# Patient Record
Sex: FEMALE | Race: WHITE
[De-identification: names, ages, dates, MRNs, and addresses within clinical notes are randomized per-mention and may not be internally consistent; named-entity substitution may affect disease eponyms.]

---

## 2017-12-29 NOTE — PT INITIAL EVALUATION
MEDICAL DIAGNOSIS: neck pain and headaches in pregnancy

TREATMENT DIAGNOSIS: same

DATE OF ONSET: 10/28/17



SUBJECTIVE: Rebekah Martinez presents to physical therapy with neck pain and 
headaches  that started approximately 2 months ago. She reports that she feels 
like  the headaches started around the same time that she found out that she 
was  pregnant. She reports that she is approximately 11 weeks along. She  
reports that it feels like the headaches starts behind her R ear and then  the 
neck pain develops. She states that the neck pain and headaches are  related. 
She states that she cannot find a position or a movement that  makes the 
headaches or neck pain feel better once they have commenced. She  states that 
sitting makes her headaches and neck pain worse. She states  that she is better 
in the am and gets worse as the day progresses.  Furthermore, she reports that 
the pain feels better if she is lying down.  She denies any of the following 
symptoms: dizziness, tinnitus, night pain,  unexplained weight loss, and 
difficulty swallowing. She reports that  posture does not seem to affect her 
pain or headaches. She states that she  typically has around 1-2 headaches a 
week, but the headaches and neck pain  have lasted as long as 3 days and as 
short as one day once it commenced.  She reports that she is having a great day 
today and is not currently  feeling any headache or neck pain symptoms.  Pain 
location is R sided: C0-C7 



REHAB PROBLEM LIST: Increased Pain

Decreased ROM

Decreased Endurance

Decreased Function

Decreased ADL's



PREVIOUS MEDICAL HISTORY: See EMR

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



OBJECTIVE: 



Posture: She demonstrated fair posture, however, the pain did not increase or  
decreased with posture correction. 



ROM: cervical: protrusion, retraction, flexion, extension, lateral flexion R/L,
  and rotation R/L. NIL with normal end feels.



Palpation: Today, she was not tender to palpation as she was having a great 
day. 



Sensation: Intact in B UEs and does not differ from side to side



Special Tests: Since, she did not have pain, we created her symptoms with 
repeated  cervical retraction and cervical retraction with extension and 
abolished  her symptoms with repeated cervical flexion. 



Mobility: Independent



ASSESSMENT: Kati will benefit from skilled physical therapy addressing the 
listed  impairments to abolish symptoms and return to prior level of function. 
She  demonstrated an anterior derangement that responded well or abolished  
symptoms to flexion based exercises. Based on today's examination, she has  an 
85-90 percent chance of making a full recovery in 2-4 weeks. 



Short Term Goals

2 weeks: Pt will demonstrate centralized neck pain to improve function and  
QOL. 

4 weeks: Pt will demonstrate abolish neck pain and headaches to improve  
function and QOL. 



Patient's Goals

decrease headaches and neck pain



PLAN:  Patient to be seen for Manual Therapy/STM/MET

Strengthening/condition

Range of Motion

Spinal Stabilization

Work Hardening/Cond

Stretching

Neuromuscular Re-ed

Closed Chain Program

Posture/Body mechanics

Home Exercise Program

Therapeutic Activities



2x/Week for 4 Weeks



If you have any questions, comments, or concerns about this report or plan, 
please contact me at [phone].



Thank you, 



Lawrence Tran, PT, DPT 
[facility]

## 2018-01-16 ENCOUNTER — HOSPITAL ENCOUNTER (OUTPATIENT)
Dept: HOSPITAL 89 - PT | Age: 33
Discharge: HOME | End: 2018-01-16
Attending: OBSTETRICS & GYNECOLOGY
Payer: COMMERCIAL

## 2018-01-16 DIAGNOSIS — Z33.1: ICD-10-CM

## 2018-01-16 DIAGNOSIS — M54.2: Primary | ICD-10-CM

## 2018-01-16 DIAGNOSIS — R51: ICD-10-CM

## 2018-01-16 PROCEDURE — 97161 PT EVAL LOW COMPLEX 20 MIN: CPT

## 2018-01-17 NOTE — PT PLAN OF CARE
Physician: Anthony Klein MD

Patient is being seen: 1x/week         Therapist: Lawrence Tran, PT, DPT 

Medical Diagnosis: neck pain and headaches in pregnancy

Treatment Diagnosis: same

Date of Onset: 10/28/17         Date of Initial Evaluation: 12/28/17

Date patient was last seen: 01/16/18

Number of treatments: 2         Number of cancellations/No shows: 0



INTERVENTIONS:

Manual Therapy/STM/MET

Strengthening/condition

Range of Motion

Spinal Stabilization

Work Hardening/Cond

Stretching

Neuromuscular Re-ed

Closed Chain Program

Posture/Body mechanics

Home Exercise Program

Therapeutic Activities





GOALS:

2 weeks: Pt will demonstrate centralized neck pain to improve function and  
QOL. MET 

4 weeks: Pt will demonstrate abolish neck pain and headaches to improve  
function and QOL. MET 



PATIENT'S GOAL:

decrease headaches and neck pain



Status of Patient's Goals: MET 



Patient Compliance:  MET          Prognosis: Excellent



Reasons for discontinuing therapy: This is a discharge note for Kati Roberto. 
Kati reports that she has been doing her exercise and she has modified her work 
station. She reports that she has no headache or neck pain. She demonstrated 
full cervical AROM with normal end feels. She improved her neck pain and 
disability index from 18% to 2%. At her initial examination, she reported neck 
pain and headaches to be 4/10 and today she reported her neck pain and 
headaches to be 0/10. She reports that she felt a headache coming up this week 
and was able to quickly eliminate her headache with her specific exercise. She 
has met all of her goals. She is independent with her specific exercise. As a 
result, she will be discharged from formal PT. 





Posture: She demonstrated fair posture, however, the pain did not increase or  
decrease with posture correction. 

ROM: cervical: protrusion, retraction, flexion, extension, lateral flexion R/L,
  and rotation R/L. NIL with normal end feels.

Palpation: Today, she was not tender to palpation as she was having a great 
day. 

Special Tests: Since, she did not have pain, we created her symptoms with 
repeated  cervical retraction and cervical retraction with extension and 
abolished  her symptoms with repeated cervical flexion. 

Mobility: Independent



If you have any questions, please contact me at .



Thank you,



Lawrence Tran, PT, DPT 
[facility]

## 2018-07-19 ENCOUNTER — HOSPITAL ENCOUNTER (INPATIENT)
Dept: HOSPITAL 89 - OB | Age: 33
LOS: 2 days | Discharge: HOME | End: 2018-07-21
Attending: OBSTETRICS & GYNECOLOGY | Admitting: OBSTETRICS & GYNECOLOGY
Payer: COMMERCIAL

## 2018-07-19 VITALS — SYSTOLIC BLOOD PRESSURE: 125 MMHG | DIASTOLIC BLOOD PRESSURE: 85 MMHG

## 2018-07-19 VITALS
WEIGHT: 210 LBS | WEIGHT: 210 LBS | BODY MASS INDEX: 34.99 KG/M2 | HEIGHT: 65 IN | BODY MASS INDEX: 34.99 KG/M2 | HEIGHT: 65 IN

## 2018-07-19 DIAGNOSIS — Z88.0: ICD-10-CM

## 2018-07-19 DIAGNOSIS — E03.9: ICD-10-CM

## 2018-07-19 DIAGNOSIS — Z3A.40: ICD-10-CM

## 2018-07-19 LAB — PLATELET COUNT, AUTOMATED: 254 K/UL (ref 150–450)

## 2018-07-19 PROCEDURE — 86900 BLOOD TYPING SEROLOGIC ABO: CPT

## 2018-07-19 PROCEDURE — 36415 COLL VENOUS BLD VENIPUNCTURE: CPT

## 2018-07-19 PROCEDURE — 86850 RBC ANTIBODY SCREEN: CPT

## 2018-07-19 PROCEDURE — 86901 BLOOD TYPING SEROLOGIC RH(D): CPT

## 2018-07-19 PROCEDURE — 85027 COMPLETE CBC AUTOMATED: CPT

## 2018-07-19 PROCEDURE — 85025 COMPLETE CBC W/AUTO DIFF WBC: CPT

## 2018-07-19 RX ADMIN — SODIUM CHLORIDE, SODIUM LACTATE, POTASSIUM CHLORIDE, AND CALCIUM CHLORIDE PRN MLS/HR: 600; 310; 30; 20 INJECTION, SOLUTION INTRAVENOUS at 21:46

## 2018-07-19 RX ADMIN — SODIUM CHLORIDE SCH MLS/HR: 900 IRRIGANT IRRIGATION at 21:47

## 2018-07-20 VITALS — SYSTOLIC BLOOD PRESSURE: 130 MMHG | DIASTOLIC BLOOD PRESSURE: 77 MMHG

## 2018-07-20 VITALS — DIASTOLIC BLOOD PRESSURE: 60 MMHG | SYSTOLIC BLOOD PRESSURE: 129 MMHG

## 2018-07-20 PROCEDURE — 0KQM0ZZ REPAIR PERINEUM MUSCLE, OPEN APPROACH: ICD-10-PCS | Performed by: OBSTETRICS & GYNECOLOGY

## 2018-07-20 RX ADMIN — SODIUM CHLORIDE SCH MLS/HR: 900 IRRIGANT IRRIGATION at 05:16

## 2018-07-20 RX ADMIN — ROPIVACAINE HYDROCHLORIDE PRN MCG: 2 INJECTION, SOLUTION EPIDURAL; INFILTRATION at 08:13

## 2018-07-20 RX ADMIN — ROPIVACAINE HYDROCHLORIDE PRN MCG: 2 INJECTION, SOLUTION EPIDURAL; INFILTRATION at 07:11

## 2018-07-20 RX ADMIN — SODIUM CHLORIDE, SODIUM LACTATE, POTASSIUM CHLORIDE, AND CALCIUM CHLORIDE PRN MLS/HR: 600; 310; 30; 20 INJECTION, SOLUTION INTRAVENOUS at 05:55

## 2018-07-20 RX ADMIN — SODIUM CHLORIDE, SODIUM LACTATE, POTASSIUM CHLORIDE, AND CALCIUM CHLORIDE PRN MLS/HR: 600; 310; 30; 20 INJECTION, SOLUTION INTRAVENOUS at 09:07

## 2018-07-20 RX ADMIN — IBUPROFEN SCH MG: 800 TABLET ORAL at 15:58

## 2018-07-20 NOTE — OB DELIVERY NOTE
Delivery Note


Vaginal Delivery Type:  Spont. Vaginal Delivery


Delivery Date:  2018


Delivery Time:  11:02


Estimated Gestational Age(wks):  40


Delivery Anesthesia:  Epidural


Infant Sex:  Male


[place] Apgars:  1 Minute (9), 5 Minute (9)


Repair Needed:  Episiotomy-Midline, Perineal, 2nd Degree


Estimated Blood Loss:  300


Delivery Complications:  Precipitous


Notes:


Presented for scheduled cervical ripening last night but found to be in early 

labor and 4 cm.  Augmented with Pitocin through night and advanced into active 

labor.  By 0800 was 9.5 cm and feeling pressure.  Epidural placed and complete 

by 0917.  Allowed to labor down.  Up to push and baby in CHATA position.  Over 3 

contractions delivered head over second degree laceration.  Nuchal x 1 easily 

reduced.  Shoulders delivered spontaneously and baby followed without 

complication.  Repair with 2-0 Chromic without complication.  Placenta 

delivered spontaneous and intact.


Pediatrician in Attendence:  No


Copies to:   DESTINEE REYES MD, TRAVIS MD 2018 11:35

## 2018-07-20 NOTE — PROCEDURE NOTE
Anesthetic Placement Note


Anesthesia Plan:  CSE


Permit for Anesthesia Signed:  Yes


Anesthesia Technique:  Patient Sitting


Anesthesia Prep:  Chlorhexidine


Interspace:  L 4-5


Local Anesthetic:  1% Lidocaine, 25 Gauge Needle


Amount Local - cc's:  3


Anesthesia Needle:  17g Touhy/Schliff


Anesthesia Attempts:  1


Loss of Resistance:  Normal Saline


Depth of GRIFFIN (cm):  7


Epidural Needle Placement:  No CSF, No Blood, No Parasthesia


Catheter Insertion (cm):  11


Catheter Type:  Gayle - Spring Wound


Epidural Dressing:  Tegaderm, Tape


Anesthesia Tray:  Lot Number [id number]), Expiration Date (2019-08-31), 

Reference Number ([id number])











CAMILA HUSSEIN CRNA Jul 20, 2018 12:50

## 2018-07-20 NOTE — HISTORY & PHYSICAL
History of Present Illness


Age of Patient:  32


:  1


Para or TPAL:  0


EDC per LMP:  2018


Estimated Gestational Age:  40


Chief Complaint


Labor


History of Present Illness


Pt was brought in for IOL with cervical ripening, planned and elective.  However

, she was found to be 4 cm dilated upon admission and plan changed to Pitocin 

induction through the night.  By this AM she was getting epidural and 

completely dilated.  GBS positive and had started Ancef on admission.  Pt 

pregnancy has been uncomplicated to date.  She does have hypothyroidism well 

controlled with medication.  Past Medical, Surgical, Family and Obstetric 

Histories reviewed. Please see [facility] prenatal chart.





History


Patient's Blood Type:  O Positive


Rubella Status:  Immune


Group B Strep Screen:  Positive


Allergies:  


Coded Allergies:  


     Penicillins (Verified  Allergy, Severe, SWELLING, 7/3/18)


 HIVES AND THROAT SWELLING


Family History:  


Diabetes mellitus (DM)


  MATERNAL GRANDMOTHER


No pertinent family history


  FATHER, Age:54


  MOTHER, Age:52


  BROTHER OR SISTER





Med Rec


Home Meds


Reported Medications


Famotidine (PEPCID) 20 Mg Tablet, 20 MG PO BID, #10 TAB


   7/3/18


Prenatal Vits W-Ca,Fe,Fa(<1MG) (PRENATAL VITAMINS) 1 Each Tablet, 1 EACH PO 

DAILY, TAB


   7/3/18


Levothyroxine Sodium (LEVOTHYROXINE SODIUM) 100 Mcg Tablet, 112 MCG PO QDAY, TAB


   7/3/18





Review of Systems


All Systems Reviewed/Normal:  Yes, Except as Noted





Exam


General Exam


Vital Signs





Vital Signs








  Date Time  Temp Pulse Resp B/P (MAP) Pulse Ox O2 Delivery O2 Flow Rate FiO2


 


18 20:30 97.3 83 18 125/85 (98) 95 Room Air  








General Apperance:  Alert/Awake/No Acute Distress (other than labor pains)


Neuro:  No Gross deficits


Cardiovascular:  Regular Rate and Rhythm


Respiratory:  No Respiratory Distress


Abdomen:  Soft, Non-Tender, Non-Distended, Gravid - Non-Tender


Integumentary:  Skin Intact without Lesions or Rash


Psychological:  Alert & Oriented X3, Appropriate Mood & Affect





Pregnancy


Cervical Dialation:  10


Cervical Effacement (%):  100


Cervical Consistency:  Soft


Cervical Position:  Anterior


Fetal Station:  0


Fetal Presentation:  Vertex





Fetus


Fetal Heart Tone Variabilty:  Moderate


FHT Accelerations:  15X15


FHT Decelerations:  Variable (with contraction)


FHT Category:  I





Medical Decision Making


Data Points


Result Diagram:  


18








VTE Prophylasis: Adult


Deep Vein Thrombosis/Pulmonary:  No


Pharmacological Contraindicati:  Pt at Low Risk for VTE


Mechanical Contraindications:  Pt at Low Risk for VTE





Assessment and Plan


OB/GYN Plan:  Routine Labor Care


Problems:  


(1) 40 weeks gestation of pregnancy


Assessment & Plan:  Epidural placed.  Will labor down and planning .





(2) Normal labor











DESTINEE REYES MD 2018 09:38

## 2018-07-20 NOTE — ANESTHESIA PROGRESS NOTE
Progress/Maintenance


Anesthesia Note Time:  09:30


Pain Intensity:  6


Pump:  On


Drug Bolus:  0.25% Marcaine


Anesthesia Treatment:  repeat loading dose











CAMILA HUSSEIN CRNA Jul 20, 2018 12:54

## 2018-07-21 VITALS — DIASTOLIC BLOOD PRESSURE: 78 MMHG | SYSTOLIC BLOOD PRESSURE: 118 MMHG

## 2018-07-21 VITALS — DIASTOLIC BLOOD PRESSURE: 66 MMHG | SYSTOLIC BLOOD PRESSURE: 112 MMHG

## 2018-07-21 VITALS — SYSTOLIC BLOOD PRESSURE: 122 MMHG | DIASTOLIC BLOOD PRESSURE: 80 MMHG

## 2018-07-21 VITALS — SYSTOLIC BLOOD PRESSURE: 126 MMHG | DIASTOLIC BLOOD PRESSURE: 78 MMHG

## 2018-07-21 LAB — PLATELET COUNT, AUTOMATED: 189 K/UL (ref 150–450)

## 2018-07-21 RX ADMIN — IBUPROFEN SCH MG: 800 TABLET ORAL at 09:28

## 2018-07-21 RX ADMIN — IBUPROFEN SCH MG: 800 TABLET ORAL at 14:04

## 2018-07-21 RX ADMIN — DOCUSATE CALCIUM SCH MG: 240 CAPSULE, LIQUID FILLED ORAL at 09:28

## 2018-07-21 RX ADMIN — DOCUSATE CALCIUM SCH MG: 240 CAPSULE, LIQUID FILLED ORAL at 00:02

## 2018-07-21 RX ADMIN — IBUPROFEN SCH MG: 800 TABLET ORAL at 00:02

## 2018-07-21 NOTE — OB/GYN PROGRESS NOTE
OB Subjective


Progress Notes


Subjective


Doing well.  Pain controlled and ambulating well.  Bleeding light.


GI:  NEG Nausea


:  Voiding Well


Pain:  Mild





OB Objective


Physical Exam





Vital Signs








  Date Time  Temp Pulse Resp B/P (MAP) Pulse Ox O2 Delivery O2 Flow Rate FiO2


 


7/21/18 08:40 98.1 92 16 122/80 (94)  Room Air  


 


7/19/18 20:30     95   








General Appearance:  Alert/Awake/No Acute Distress (other than labor pains)


Neurological:  No Gross deficits


Cardiovascular:  Normal Rhythm & Peripheral Pulses, Regular Rate and Rhythm


Respiratory:  No Respiratory Distress, Clear to Auscultation


Abdomen:  Soft, Non-Tender, Non-Distended, Fundus Firm, Non-Tender


Integumentary:  Skin Intact without Lesions or Rash


Psychological:  Alert & Oriented X3, Appropriate Mood & Affect


Result Diagram:  


7/21/18 0621








Assessment and Plan


OB/GYN Plan:  Discharge Home Today


Problems:  


(1) 40 weeks gestation of pregnancy


(2) Normal labor


(3) Postpartum care and examination immediately after delivery


Assessment & Plan:  Reviewed discharge instructions and precautions.  Call if 

issues.  f/u at 6 weeks.














DESTINEE REYES MD Jul 21, 2018 12:44

## 2018-07-21 NOTE — OB/GYN DISCHARGE SUMMARY
Discharge Summary


Reason for Hosp/Final Diag:  


(1) 40 weeks gestation of pregnancy


(2) Normal labor


(3) Postpartum care and examination immediately after delivery


Hospital Course & Plan:  Reviewed discharge instructions and precautions.  Call 

if issues.  f/u at 6 weeks.





Lates Vital Signs





Vital Signs








  Date Time  Temp Pulse Resp B/P (MAP) Pulse Ox O2 Delivery O2 Flow Rate FiO2


 


7/21/18 08:40 98.1 92 16 122/80 (94)  Room Air  


 


7/19/18 20:30     95   








Weight (Pounds):  210


Result Diagram:  


7/21/18 0621





Condition:  Improved


Discharge:  Home, Self Care


Home Meds


Active Scripts


Hydrocodone Bit/Acetaminophen (HYDROCODON-ACETAMINOPHEN 5-325) 1 Each Tablet, 1 

EACH PO Q4-6H Y for PAIN, #14 TAB 0 Refills


   Prov:GUEVARA MARCH MD         7/21/18


Reported Medications


Famotidine (PEPCID) 20 Mg Tablet, 20 MG PO BID, #10 TAB


   7/3/18


Prenatal Vits W-Ca,Fe,Fa(<1MG) (PRENATAL VITAMINS) 1 Each Tablet, 1 EACH PO 

DAILY, TAB


   7/3/18


Levothyroxine Sodium (LEVOTHYROXINE SODIUM) 100 Mcg Tablet, 112 MCG PO QDAY, TAB


   7/3/18


Follow up Referrals:  


OB/GYN - In 6 Weeks @ [facility] with Guevara March Md





Follow up with:  Dr. March [phone]


Follow up in:  6 wks PP or PO


Discharge Diet:  As Tolerates


Discharge Activity:  As Tolerates, No Heavy Lifting x 6 wks, No Heavy Lifting > 

10lb, Pelvic Rest


Copies to:   GUEVARA MARCH MD, TRAVIS MD Jul 21, 2018 12:48

## 2018-11-23 ENCOUNTER — HOSPITAL ENCOUNTER (EMERGENCY)
Dept: HOSPITAL 89 - ER | Age: 33
Discharge: HOME | End: 2018-11-23
Payer: COMMERCIAL

## 2018-11-23 VITALS — DIASTOLIC BLOOD PRESSURE: 83 MMHG | SYSTOLIC BLOOD PRESSURE: 119 MMHG

## 2018-11-23 VITALS — BODY MASS INDEX: 34.94 KG/M2 | WEIGHT: 180 LBS

## 2018-11-23 DIAGNOSIS — Z88.0: ICD-10-CM

## 2018-11-23 DIAGNOSIS — J01.90: Primary | ICD-10-CM

## 2018-11-23 PROCEDURE — 99282 EMERGENCY DEPT VISIT SF MDM: CPT

## 2018-11-23 NOTE — ER REPORT
History and Physical


Time Seen By MD:  08:31


Hx. of Stated Complaint:  


SORE THOAT, BILAT EAR PAIN, CONGESTION, HEADACHES, SLIGHT FEVERS X 10 DAYS


HPI/ROS


CHIEF COMPLAINT: Fever, headaches, sinus pressure, ear pain





HISTORY OF PRESENT ILLNESS: Patient is a 33-year-old female who states that 


she's had upper respiratory symptoms for approximately 10 days. She currently is


describing fevers headaches some body aches along with sinus pressure and nasal 


discharge. She does report ill contacts at home. Patient states she is breast-


feeding a 4-month-old.





REVIEW OF SYSTEMS:


ENT: Sinus pressure, ear pain


Respiratory: Dry cough


Cardiovascular: No chest pain, no palpitations.


Gastrointestinal: No vomiting, no abdominal pain.


Musculoskeletal: No back pain.


Allergies:  


Coded Allergies:  


     Penicillins (Verified  Allergy, Severe, SWELLING, 11/23/18)


   


   HIVES AND THROAT SWELLING


Home Meds


Active Scripts


Pseudoephedrine Hcl (SUDAFED 12 HOUR) 120 Mg Tablet.er, 120 MG PO Q12H, #20 TAB 


0 Refills


   Prov:STACIE HARRISON MD         11/23/18


Azithromycin (ZITHROMAX) 250 Mg Tablet, 1 TAB PO QDAY for 4 Days, #4 TAB 1 


Refill


   Prov:STACIE HARRISON MD         11/23/18


Ibuprofen (IBUPROFEN) 800 Mg Tablet, 800 MG PO Q8H PRN for PAIN, #30 TAB 1 


Refill


   Prov:DESTINEE REYES MD         7/21/18


Reported Medications


Guaifenesin (MUCINEX) 600 Mg Tablet.er, 600 MG PO PRN


   11/23/18


Levothyroxine Sodium (LEVOTHYROXINE SODIUM) 100 Mcg Tablet, 112 MCG PO QDAY, TAB


   7/3/18


Discontinued Reported Medications


Famotidine (PEPCID) 20 Mg Tablet, 20 MG PO BID, #10 TAB


   7/3/18


Prenatal Vits W-Ca,Fe,Fa(<1MG) (PRENATAL VITAMINS) 1 Each Tablet, 1 EACH PO 


DAILY, TAB


   7/3/18


Discontinued Scripts


Hydrocodone Bit/Acetaminophen (HYDROCODON-ACETAMINOPHEN 5-325) 1 Each Tablet, 1 


EACH PO Q4-6H PRN for PAIN, #14 TAB 0 Refills


   Prov:DESTINEE REYES MD         7/21/18


Past Medical/Surgical History


Breast feeding currently


Hx Smoking:  No


Smoking Status:  Never Smoker


Exposure to Second Hand Smoke?:  No


Constitutional





Vital Sign - Last 24 Hours








 11/23/18





 08:23


 


Temp 98.6


 


Pulse 85


 


Resp 16


 


B/P (MAP) 119/83


 


Pulse Ox 92


 


O2 Delivery Room Air








Physical Exam


   General Appearance: Alert, no distress.


Eyes: Pupils equal and round no pallor or injection.


ENT, Mouth: Ears: Tympanic membranes are normal. 


   Nose: No bleeding.


   Mouth: Mucous membranes are moist. 


   Throat: No erythema with posterior cobblestoning and uvula is midline.


Musculoskeletal: Neck is supple non tender, no adenopathy.


Skin: Warm and dry, no rashes.





 [ ]





Medical Decision Making


ED Course/Re-evaluation


ED Course


 11/23/2018 8:35:45 am patient with likely sinusitis based on history of 10 days


of upper respiratory symptoms, ear pain and sinus pressure. Patient is allergic 


to penicillin will place on Zithromax.


Decision to Disposition Date:  Nov 23, 2018


Decision to Disposition Time:  08:42





Depart


Departure


Latest Vital Signs





Vital Signs








  Date Time  Temp Pulse Resp B/P (MAP) Pulse Ox O2 Delivery O2 Flow Rate FiO2


 


11/23/18 08:23 98.6 85 16 119/83 92 Room Air  








Impression:  


   Primary Impression:  


   Acute sinus infection


Condition:  Improved


Disposition:  HOME OR SELF-CARE


New Scripts


Pseudoephedrine Hcl (SUDAFED 12 HOUR) 120 Mg Tablet.er


120 MG PO Q12H, #20 TAB 0 Refills


   Prov: STACIE HARRISON MD         11/23/18 


Azithromycin (ZITHROMAX) 250 Mg Tablet


1 TAB PO QDAY for 4 Days, #4 TAB 1 Refill


   Prov: STACIE HARRISON MD         11/23/18


Patient Instructions:  Sinusitis (ED)





Problem Qualifiers








   Primary Impression:  


   Acute sinus infection


   Sinusitis location:  unspecified location  Recurrence:  not specified as 


   recurrent  Qualified Codes:  J01.90 - Acute sinusitis, unspecified








STACIE HARRISON MD             Nov 23, 2018 08:38

## 2019-01-06 ENCOUNTER — HOSPITAL ENCOUNTER (EMERGENCY)
Dept: HOSPITAL 89 - ER | Age: 34
Discharge: HOME | End: 2019-01-06
Payer: COMMERCIAL

## 2019-01-06 VITALS — SYSTOLIC BLOOD PRESSURE: 112 MMHG | DIASTOLIC BLOOD PRESSURE: 77 MMHG

## 2019-01-06 VITALS — WEIGHT: 180 LBS | BODY MASS INDEX: 34.94 KG/M2

## 2019-01-06 DIAGNOSIS — R10.9: ICD-10-CM

## 2019-01-06 DIAGNOSIS — R19.7: ICD-10-CM

## 2019-01-06 DIAGNOSIS — R11.2: Primary | ICD-10-CM

## 2019-01-06 LAB — PLATELET COUNT, AUTOMATED: 259 K/UL (ref 150–450)

## 2019-01-06 PROCEDURE — 82565 ASSAY OF CREATININE: CPT

## 2019-01-06 PROCEDURE — 82374 ASSAY BLOOD CARBON DIOXIDE: CPT

## 2019-01-06 PROCEDURE — 82310 ASSAY OF CALCIUM: CPT

## 2019-01-06 PROCEDURE — 84295 ASSAY OF SERUM SODIUM: CPT

## 2019-01-06 PROCEDURE — 96375 TX/PRO/DX INJ NEW DRUG ADDON: CPT

## 2019-01-06 PROCEDURE — 82247 BILIRUBIN TOTAL: CPT

## 2019-01-06 PROCEDURE — 84075 ASSAY ALKALINE PHOSPHATASE: CPT

## 2019-01-06 PROCEDURE — 82947 ASSAY GLUCOSE BLOOD QUANT: CPT

## 2019-01-06 PROCEDURE — 84132 ASSAY OF SERUM POTASSIUM: CPT

## 2019-01-06 PROCEDURE — 81001 URINALYSIS AUTO W/SCOPE: CPT

## 2019-01-06 PROCEDURE — 85025 COMPLETE CBC W/AUTO DIFF WBC: CPT

## 2019-01-06 PROCEDURE — 96365 THER/PROPH/DIAG IV INF INIT: CPT

## 2019-01-06 PROCEDURE — 84450 TRANSFERASE (AST) (SGOT): CPT

## 2019-01-06 PROCEDURE — 84520 ASSAY OF UREA NITROGEN: CPT

## 2019-01-06 PROCEDURE — 84460 ALANINE AMINO (ALT) (SGPT): CPT

## 2019-01-06 PROCEDURE — 84155 ASSAY OF PROTEIN SERUM: CPT

## 2019-01-06 PROCEDURE — 82040 ASSAY OF SERUM ALBUMIN: CPT

## 2019-01-06 PROCEDURE — 99284 EMERGENCY DEPT VISIT MOD MDM: CPT

## 2019-01-06 PROCEDURE — 82435 ASSAY OF BLOOD CHLORIDE: CPT

## 2019-01-06 NOTE — ER REPORT
History and Physical


Time Seen By MD:  15:15


Hx. of Stated Complaint:  


n/v/d


HPI/ROS


CHIEF COMPLAINT: Vomiting





HISTORY OF PRESENT ILLNESS: Patient presents with sudden onset of vomiting 


multiple episodes. Vomiting was initially food now is yellowish. No bloody or 


black vomit. Patient has had some watery diarrhea. Abdominal crampy pain began 


after vomiting. Pain is moderate, radiates throughout abdomen, relieved somewhat


with vomiting but quickly recurs. Patient denies fever, chest pain, shortness 


breath. She has not no if she has been exposed to include source. Does not have 


any known sick contacts.





REVIEW OF SYSTEMS:


Constitutional: No fever, no chills.


Eyes: No discharge.


ENT: No sore throat. 


Cardiovascular: No chest pain, no palpitations.


Respiratory: No cough, no shortness of breath.


Gastrointestinal: above


Genitourinary: no change in urination


Musculoskeletal: No back pain.


Skin: No rashes.


Neurological: No headache.


Remainder of the 14 system rev:  Yes


Allergies:  


Coded Allergies:  


     Penicillins (Verified  Allergy, Severe, SWELLING, 1/6/19)


   


   HIVES AND THROAT SWELLING


Home Meds


Active Scripts


Ondansetron 4 Mg Odt (ONDANSETRON 4 MG ODT) 4 Mg Tab.rapdis, 4 MG PO ONCE for 


Muscle Relaxant, #10 TAB


   Prov:STACIE BIRD MD         1/6/19


Discontinued Reported Medications


Guaifenesin (MUCINEX) 600 Mg Tablet.er, 600 MG PO PRN


   11/23/18


Levothyroxine Sodium (LEVOTHYROXINE SODIUM) 100 Mcg Tablet, 112 MCG PO QDAY, TAB


   7/3/18


Discontinued Scripts


Pseudoephedrine Hcl (SUDAFED 12 HOUR) 120 Mg Tablet.er, 120 MG PO Q12H, #20 TAB 


0 Refills


   Prov:STACIE HARRISON MD         11/23/18


Azithromycin (ZITHROMAX) 250 Mg Tablet, 1 TAB PO QDAY for 4 Days, #4 TAB 1 


Refill


   Prov:STACIE HARRISON MD         11/23/18


Ibuprofen (IBUPROFEN) 800 Mg Tablet, 800 MG PO Q8H PRN for PAIN, #30 TAB 1 


Refill


   Prov:DESTINEE REYES MD         7/21/18


Reviewed Nurses Notes:  Yes


Hx Smoking:  No


Smoking Status:  Never Smoker


Exposure to Second Hand Smoke?:  No


Constitutional





Vital Sign - Last 24 Hours








 1/6/19 1/6/19 1/6/19 1/6/19





 15:32 15:34 15:45 15:58


 


Temp  97.6  


 


Pulse  101  92


 


Resp  16  


 


B/P (MAP) 100/79 (86) 100/79 113/79 (90) 


 


Pulse Ox  96  94


 


O2 Delivery  Room Air  


 


    





 1/6/19 1/6/19 1/6/19 1/6/19





 16:00 16:15 16:20 16:30


 


Pulse   98 


 


B/P (MAP) 98/68 (78) 101/64 (76)  104/64 (77)


 


Pulse Ox   95 





 1/6/19 1/6/19 1/6/19 1/6/19





 16:45 17:20 17:25 17:27


 


Pulse  89 88 


 


B/P (MAP) 101/69 (80)   116/76 (89)


 


Pulse Ox   95 





 1/6/19   





 17:30   


 


B/P (MAP) 112/77 (89)   














Intake and Output   


 


 1/6/19 1/6/19 1/7/19





 15:00 23:00 07:00


 


Intake Total  1050 ml 


 


Balance  1050 ml 








Physical Exam


   General Appearance: The patient is alert, has no immediate need for airway 


protection and no signs of toxicity. 


Eyes: Pupils equal and round no pallor or injection.


ENT, Mouth: Mucous membranes are moist.


Respiratory: There are no retractions, lungs are clear to auscultation.


Cardiovascular: Regular rate and rhythm. 


Gastrointestinal:  hyperactive bowel sounds, abdominal pain throughout without 


peritoneal sgs


Neurological: alert, oriented, moves all ext


Skin: Warm and dry, no rashes.


Musculoskeletal:  


      Extremities are nontender, nonswollen and have full range of motion.





DIFFERENTIAL DIAGNOSIS: After history and physical exam differential diagnosis 


was considered for abdominal pain including but not limited to appendicitis, 


cholecystitis, gastritis and urinary tract infection.





Medical Decision Making


Data Points


Result Diagram:  


1/6/19 1548                                                                     


          1/6/19 1548





Laboratory





Hematology








Test


 1/6/19


15:48 1/6/19


17:00


 


Red Blood Count


 5.86 M/uL


(4.17-5.56) 





 


Mean Corpuscular Volume


 81.0 fL


(80.0-96.0) 





 


Mean Corpuscular Hemoglobin


 27.5 pg


(26.0-33.0) 





 


Mean Corpuscular Hemoglobin


Concent 34.0 g/dL


(32.0-36.0) 





 


Red Cell Distribution Width


 15.4 %


(11.5-14.5) 





 


Mean Platelet Volume


 7.8 fL


(7.2-11.1) 





 


Neutrophils (%) (Auto)


 89.0 %


(39.4-72.5) 





 


Lymphocytes (%) (Auto)


 4.7 %


(17.6-49.6) 





 


Monocytes (%) (Auto)


 5.1 %


(4.1-12.4) 





 


Eosinophils (%) (Auto)


 0.6 %


(0.4-6.7) 





 


Basophils (%) (Auto)


 0.6 %


(0.3-1.4) 





 


Nucleated RBC Relative Count


(auto) 0.0 /100WBC 


 





 


Neutrophils # (Auto)


 9.9 K/uL


(2.0-7.4) 





 


Lymphocytes # (Auto)


 0.5 K/uL


(1.3-3.6) 





 


Monocytes # (Auto)


 0.6 K/uL


(0.3-1.0) 





 


Eosinophils # (Auto)


 0.1 K/uL


(0.0-0.5) 





 


Basophils # (Auto)


 0.1 K/uL


(0.0-0.1) 





 


Nucleated RBC Absolute Count


(auto) 0.00 K/uL 


 





 


Sodium Level


 138 mmol/L


(137-145) 





 


Potassium Level


 4.0 mmol/L


(3.5-5.0) 





 


Chloride Level


 106 mmol/L


() 





 


Carbon Dioxide Level


 19 mmol/L


(22-31) 





 


Blood Urea Nitrogen


 18 mg/dl


(7-18) 





 


Creatinine


 1.00 mg/dl


(0.52-1.04) 





 


Glomerular Filtration Rate


Calc > 60.0 


 





 


Random Glucose


 100 mg/dl


() 





 


Calcium Level


 9.8 mg/dl


(8.4-10.2) 





 


Total Bilirubin


 2.1 mg/dl


(0.2-1.3) 





 


Aspartate Amino Transf


(AST/SGOT) 57 U/L (0-35) 


 





 


Alanine Aminotransferase


(ALT/SGPT) 43 U/L (0-56) 


 





 


Alkaline Phosphatase


 119 U/L


(0-126) 





 


Total Protein


 8.9 g/dl


(6.3-8.2) 





 


Albumin


 4.9 g/dl


(3.5-5.0) 





 


Urine Color  Yellow 


 


Urine Clarity  Clear 


 


Urine pH


 


 5.0 pH


(4.8-9.5)


 


Urine Specific Gravity  1.024 


 


Urine Protein


 


 30 mg/dL


(NEGATIVE)


 


Urine Glucose (UA)


 


 Negative mg/dL


(NEGATIVE)


 


Urine Ketones


 


 Trace mg/dL


(NEGATIVE)


 


Urine Blood


 


 Negative


(NEGATIVE)


 


Urine Nitrite


 


 Negative


(NEGATIVE)


 


Urine Bilirubin


 


 Negative


(NEGATIVE)


 


Urine Urobilinogen


 


 Negative mg/dL


(0.2-1.9)


 


Urine Leukocyte Esterase


 


 Negative


(NEGATIVE)


 


Urine RBC


 


 <1 /HPF


(0-2/HPF)


 


Urine WBC


 


 1 /HPF


(0-5/HPF)


 


Urine Squamous Epithelial


Cells 


 Many /LPF


(</=FEW)


 


Urine Bacteria


 


 Negative /HPF


(NONE-FEW)


 


Urine Hyaline Casts


 


 Few /LPF


(NONE-FEW)


 


Urine Mucus


 


 Few /HPF


(NONE-FEW)








Chemistry








Test


 1/6/19


15:48 1/6/19


17:00


 


White Blood Count


 11.1 k/uL


(4.5-11.0) 





 


Red Blood Count


 5.86 M/uL


(4.17-5.56) 





 


Hemoglobin


 16.1 g/dL


(12.0-16.0) 





 


Hematocrit


 47.5 %


(34.0-47.0) 





 


Mean Corpuscular Volume


 81.0 fL


(80.0-96.0) 





 


Mean Corpuscular Hemoglobin


 27.5 pg


(26.0-33.0) 





 


Mean Corpuscular Hemoglobin


Concent 34.0 g/dL


(32.0-36.0) 





 


Red Cell Distribution Width


 15.4 %


(11.5-14.5) 





 


Platelet Count


 259 K/uL


(150-450) 





 


Mean Platelet Volume


 7.8 fL


(7.2-11.1) 





 


Neutrophils (%) (Auto)


 89.0 %


(39.4-72.5) 





 


Lymphocytes (%) (Auto)


 4.7 %


(17.6-49.6) 





 


Monocytes (%) (Auto)


 5.1 %


(4.1-12.4) 





 


Eosinophils (%) (Auto)


 0.6 %


(0.4-6.7) 





 


Basophils (%) (Auto)


 0.6 %


(0.3-1.4) 





 


Nucleated RBC Relative Count


(auto) 0.0 /100WBC 


 





 


Neutrophils # (Auto)


 9.9 K/uL


(2.0-7.4) 





 


Lymphocytes # (Auto)


 0.5 K/uL


(1.3-3.6) 





 


Monocytes # (Auto)


 0.6 K/uL


(0.3-1.0) 





 


Eosinophils # (Auto)


 0.1 K/uL


(0.0-0.5) 





 


Basophils # (Auto)


 0.1 K/uL


(0.0-0.1) 





 


Nucleated RBC Absolute Count


(auto) 0.00 K/uL 


 





 


Glomerular Filtration Rate


Calc > 60.0 


 





 


Calcium Level


 9.8 mg/dl


(8.4-10.2) 





 


Total Bilirubin


 2.1 mg/dl


(0.2-1.3) 





 


Aspartate Amino Transf


(AST/SGOT) 57 U/L (0-35) 


 





 


Alanine Aminotransferase


(ALT/SGPT) 43 U/L (0-56) 


 





 


Alkaline Phosphatase


 119 U/L


(0-126) 





 


Total Protein


 8.9 g/dl


(6.3-8.2) 





 


Albumin


 4.9 g/dl


(3.5-5.0) 





 


Urine Color  Yellow 


 


Urine Clarity  Clear 


 


Urine pH


 


 5.0 pH


(4.8-9.5)


 


Urine Specific Gravity  1.024 


 


Urine Protein


 


 30 mg/dL


(NEGATIVE)


 


Urine Glucose (UA)


 


 Negative mg/dL


(NEGATIVE)


 


Urine Ketones


 


 Trace mg/dL


(NEGATIVE)


 


Urine Blood


 


 Negative


(NEGATIVE)


 


Urine Nitrite


 


 Negative


(NEGATIVE)


 


Urine Bilirubin


 


 Negative


(NEGATIVE)


 


Urine Urobilinogen


 


 Negative mg/dL


(0.2-1.9)


 


Urine Leukocyte Esterase


 


 Negative


(NEGATIVE)


 


Urine RBC


 


 <1 /HPF


(0-2/HPF)


 


Urine WBC


 


 1 /HPF


(0-5/HPF)


 


Urine Squamous Epithelial


Cells 


 Many /LPF


(</=FEW)


 


Urine Bacteria


 


 Negative /HPF


(NONE-FEW)


 


Urine Hyaline Casts


 


 Few /LPF


(NONE-FEW)


 


Urine Mucus


 


 Few /HPF


(NONE-FEW)








Urinalysis








Test


 1/6/19


17:00


 


Urine Color Yellow 


 


Urine Clarity Clear 


 


Urine pH


 5.0 pH


(4.8-9.5)


 


Urine Specific Gravity 1.024 


 


Urine Protein


 30 mg/dL


(NEGATIVE)


 


Urine Glucose (UA)


 Negative mg/dL


(NEGATIVE)


 


Urine Ketones


 Trace mg/dL


(NEGATIVE)


 


Urine Blood


 Negative


(NEGATIVE)


 


Urine Nitrite


 Negative


(NEGATIVE)


 


Urine Bilirubin


 Negative


(NEGATIVE)


 


Urine Urobilinogen


 Negative mg/dL


(0.2-1.9)


 


Urine Leukocyte Esterase


 Negative


(NEGATIVE)


 


Urine RBC


 <1 /HPF


(0-2/HPF)


 


Urine WBC


 1 /HPF


(0-5/HPF)


 


Urine Squamous Epithelial


Cells Many /LPF


(</=FEW)


 


Urine Bacteria


 Negative /HPF


(NONE-FEW)


 


Urine Hyaline Casts


 Few /LPF


(NONE-FEW)


 


Urine Mucus


 Few /HPF


(NONE-FEW)











ED Course/Re-evaluation


ED Course


Pt presents with n/v/d > abdominal pain; sudden onset; remains hd stable in ED 


and sig improved after ED eval; of note pt has elevated bilirubin similar to 


other similar patients recently. Will d/c with SRP's as she is sig improved and 


does not have acute abd. Understands strict rtn precautions and need for close 


f/u.


Decision to Disposition Date:  Jan 6, 2019


Decision to Disposition Time:  17:30





Depart


Departure


Latest Vital Signs





Vital Signs








  Date Time  Temp Pulse Resp B/P (MAP) Pulse Ox O2 Delivery O2 Flow Rate FiO2


 


1/6/19 17:30    112/77 (89)    


 


1/6/19 17:25  88   95   


 


1/6/19 15:34 97.6  16   Room Air  








Impression:  


   Primary Impression:  


   Vomiting and diarrhea


   Additional Impression:  


   Elevated bilirubin


Condition:  Improved


Disposition:  HOME OR SELF-CARE


New Scripts


Ondansetron 4 Mg Odt (ONDANSETRON 4 MG ODT) 4 Mg Tab.rapdis


4 MG PO ONCE for Muscle Relaxant, #10 TAB


   Prov: STACIE BIRD MD         1/6/19


Patient Instructions:  Acute Nausea and Vomiting (ED)





Additional Instructions:  


As we discussed, your bilirubin was elevated. This may be due to the illness, 


however follow-up this week to have it rechecked. Please return immediately if 


you are having uncontrollable vomiting, abdominal pain, or any concerns. I 


prescribed Zofran to take as needed for nausea. No nausea medication is known to


be completely safe in breast-feeding, however there are not known adverse 


effects with Zofran. Take only as needed in order to maintain hydration.





Problem Qualifiers











STACIE BIRD MD                 Jan 6, 2019 17:26

## 2019-01-17 ENCOUNTER — HOSPITAL ENCOUNTER (OUTPATIENT)
Dept: HOSPITAL 89 - LAB | Age: 34
End: 2019-01-17
Attending: OBSTETRICS & GYNECOLOGY
Payer: COMMERCIAL

## 2019-01-17 VITALS — BODY MASS INDEX: 34.94 KG/M2

## 2019-01-17 DIAGNOSIS — E03.9: Primary | ICD-10-CM

## 2019-01-17 DIAGNOSIS — E80.6: ICD-10-CM

## 2019-01-17 PROCEDURE — 36415 COLL VENOUS BLD VENIPUNCTURE: CPT

## 2019-01-17 PROCEDURE — 82310 ASSAY OF CALCIUM: CPT

## 2019-01-17 PROCEDURE — 84443 ASSAY THYROID STIM HORMONE: CPT

## 2019-01-17 PROCEDURE — 84295 ASSAY OF SERUM SODIUM: CPT

## 2019-01-17 PROCEDURE — 84450 TRANSFERASE (AST) (SGOT): CPT

## 2019-01-17 PROCEDURE — 82435 ASSAY OF BLOOD CHLORIDE: CPT

## 2019-01-17 PROCEDURE — 82247 BILIRUBIN TOTAL: CPT

## 2019-01-17 PROCEDURE — 84132 ASSAY OF SERUM POTASSIUM: CPT

## 2019-01-17 PROCEDURE — 82565 ASSAY OF CREATININE: CPT

## 2019-01-17 PROCEDURE — 84155 ASSAY OF PROTEIN SERUM: CPT

## 2019-01-17 PROCEDURE — 82374 ASSAY BLOOD CARBON DIOXIDE: CPT

## 2019-01-17 PROCEDURE — 82947 ASSAY GLUCOSE BLOOD QUANT: CPT

## 2019-01-17 PROCEDURE — 84075 ASSAY ALKALINE PHOSPHATASE: CPT

## 2019-01-17 PROCEDURE — 82040 ASSAY OF SERUM ALBUMIN: CPT

## 2019-01-17 PROCEDURE — 84460 ALANINE AMINO (ALT) (SGPT): CPT

## 2019-01-17 PROCEDURE — 84520 ASSAY OF UREA NITROGEN: CPT

## 2019-03-12 ENCOUNTER — HOSPITAL ENCOUNTER (OUTPATIENT)
Dept: HOSPITAL 89 - LAB | Age: 34
End: 2019-03-12
Attending: OBSTETRICS & GYNECOLOGY
Payer: COMMERCIAL

## 2019-03-12 VITALS — BODY MASS INDEX: 34.94 KG/M2

## 2019-03-12 DIAGNOSIS — E03.9: Primary | ICD-10-CM

## 2019-03-12 PROCEDURE — 84443 ASSAY THYROID STIM HORMONE: CPT

## 2019-03-12 PROCEDURE — 36415 COLL VENOUS BLD VENIPUNCTURE: CPT

## 2019-04-12 ENCOUNTER — HOSPITAL ENCOUNTER (OUTPATIENT)
Dept: HOSPITAL 89 - LAB | Age: 34
End: 2019-04-12
Attending: OBSTETRICS & GYNECOLOGY
Payer: COMMERCIAL

## 2019-04-12 VITALS — BODY MASS INDEX: 34.94 KG/M2

## 2019-04-12 DIAGNOSIS — E03.9: Primary | ICD-10-CM

## 2019-04-12 PROCEDURE — 36415 COLL VENOUS BLD VENIPUNCTURE: CPT

## 2019-04-12 PROCEDURE — 84443 ASSAY THYROID STIM HORMONE: CPT

## 2019-06-12 ENCOUNTER — HOSPITAL ENCOUNTER (OUTPATIENT)
Dept: HOSPITAL 89 - LAB | Age: 34
End: 2019-06-12
Attending: OBSTETRICS & GYNECOLOGY
Payer: COMMERCIAL

## 2019-06-12 VITALS — BODY MASS INDEX: 34.94 KG/M2

## 2019-06-12 DIAGNOSIS — E03.9: Primary | ICD-10-CM

## 2019-06-12 PROCEDURE — 36415 COLL VENOUS BLD VENIPUNCTURE: CPT

## 2019-06-12 PROCEDURE — 84443 ASSAY THYROID STIM HORMONE: CPT
